# Patient Record
Sex: MALE | Race: WHITE | NOT HISPANIC OR LATINO | Employment: OTHER | URBAN - METROPOLITAN AREA
[De-identification: names, ages, dates, MRNs, and addresses within clinical notes are randomized per-mention and may not be internally consistent; named-entity substitution may affect disease eponyms.]

---

## 2024-07-06 NOTE — PATIENT INSTRUCTIONS
It was great meeting you today Pancho!     As we discussed, I have ordered labs for you.   Please have your blood drawn in the next 1-2 weeks.   You need to be FASTING for 12 hours prior to blood draw.  Please contact your insurance company to ask about the best location to get blood drawn.  We will contact you with the results of your blood work and any necessary adjustments to your plan of care.  Iif you do not hear from us within 3-5 days of having your blood drawn, please call the [place] office at [phone]     I have ordered Cologuard test for screening purposes.     I have also ordered a Cardiac CT calcium scoring test as well a venous duplex for the bilateral lower extremity swelling and discoloration.     Follow up in 4 months.

## 2024-07-06 NOTE — PROGRESS NOTES
Patient ID:   Pancho Brito is a 64 y.o. male with PMH remarkable for arteriosclerotic cardiovascular disease, JULIO C, vitamin D deficiency, BPH, and depression who presents to the office today to establish care with me.  No chief complaint on file..    HEALTH MAINTENANCE: Establish Care  Previous PCP:   Last Office Visit:  Last Labs: 2022  PSA Screening (starting at age 55 till 69): 2/1/2024 -> 13.80 on Flomax  Colonoscopy (45-75 or age 40 with 1st degree relative dx colon ca): Had a Cologuard 6/17/2021. Normal result Never had abnormal results. Will repeat COloguard  Lung cancer screening (50-78 y/o + 20 pack year + smoking/quit in last 15 years):  Never a smoker  Cardiac Calcium Scoring (55+, q 10 years): Ordered   DEXA (65+, q 2 years): NA    HPI  BPH with LUTs  hx of elevated PSA, and BPH w/ LUTs. Dr Rangel recommended a prostate biopsy. Patient refused biopsy without an MRI. MRI was ordered, but patient's insurance would not pay for it.   Currently only taking  0.4 Flomax instead of 0.8 per Dr. Hughes   Patient wishes for conservative treatment.    Patient's father had prostate cancer   No previous prostate biopsies     Vitamin D deficiency  Currently takes vitamin d -> 50,000 units  Will check labs    JULIO C  Currently using CPAP  Scheduled for a sleep study next week.     Arteriosclerotic Cardiovascular Disease  Will check for CT Cardiac calcium scoring  Will check labs     Depression  Currently feeling well.     BLE Edema and skin discoloration  Hemosideran deposits started about a year ago. Ankle and Lower leg swelling. Denies pain. Varicose veins present.          There is no immunization history on file for this patient.    Review of Systems   Constitutional: Negative.    Respiratory:  Positive for cough. Negative for shortness of breath and wheezing.    Cardiovascular:  Positive for leg swelling. Negative for chest pain and palpitations.   Gastrointestinal: Negative.    Genitourinary:  Positive for  "frequency. Negative for difficulty urinating.   Musculoskeletal: Negative.    Skin:  Positive for color change.        Bilaterally on inner ankles    Neurological: Negative.  Negative for dizziness, light-headedness and headaches.   Psychiatric/Behavioral: Negative.  Negative for sleep disturbance. The patient is not nervous/anxious.        Not on File     There were no vitals taken for this visit.    Physical Exam  Constitutional:       General: He is not in acute distress.     Appearance: Normal appearance.   HENT:      Head: Normocephalic.   Cardiovascular:      Rate and Rhythm: Normal rate and regular rhythm.      Pulses: Normal pulses.      Heart sounds: Normal heart sounds. No murmur heard.     No friction rub. No gallop.   Pulmonary:      Effort: Pulmonary effort is normal.      Breath sounds: Normal breath sounds. No wheezing, rhonchi or rales.   Abdominal:      General: Bowel sounds are normal. There is no distension.      Palpations: Abdomen is soft.      Tenderness: There is no abdominal tenderness. There is no guarding.   Musculoskeletal:         General: Normal range of motion.      Cervical back: Normal range of motion.   Skin:     General: Skin is warm.   Neurological:      Mental Status: He is alert and oriented to person, place, and time.   Psychiatric:         Mood and Affect: Mood normal.         No current outpatient medications    No results found for: \"WBC\", \"HGB\", \"HCT\", \"PLT\", \"CHOL\", \"TRIG\", \"HDL\", \"LDLDIRECT\", \"ALT\", \"AST\", \"NA\", \"K\", \"CL\", \"CREATININE\", \"BUN\", \"CO2\", \"TSH\", \"PSA\", \"INR\", \"GLUF\", \"HGBA1C\", \"ALBUR\"    ASSESSMENT AND PLAN:  Assessment/Plan   Problem List Items Addressed This Visit             ICD-10-CM    Benign prostatic hyperplasia with lower urinary tract symptoms N40.1     Dr Rangel recommended a prostate biopsy. Patient refused biopsy without an MRI.   -MRI was ordered, but patient's insurance would not pay for it.   -Currently only taking  0.4 Flomax instead of 0.8 as " ordered by Dr. Hughes   -Reports that 0.8 mg lowers his BP too much.  -Patient wishes for conservative treatment.    -Patient's father had prostate cancer   -No previous prostate biopsies          Vitamin D deficiency E55.9     Currently taking 50,000 units when he remembers  -Refills sent  -Will recheck labs         JULIO C (obstructive sleep apnea) G47.33     Currently using CPAP  -Scheduled for sleep study next week.         Depression F32.A     Currently feels well         Peripheral venous insufficiency I87.2     Hemosideran deposits started about a year ago.   -Ankle and Lower leg swelling.   -Denies pain.   -Varicose veins present.   -Venous Duplex ordered  -Compression stockings encouraged   -Weight management encouraged   -Elevate legs periodically  -Regular exercises encouraged to increase circulation           Other Visit Diagnoses         Codes    Screening PSA (prostate specific antigen)    -  Primary Z12.5    Relevant Orders    Prostate Specific Antigen, Screen    Screening for cardiovascular condition     Z13.6    Relevant Orders    CT cardiac scoring wo IV contrast    Healthcare maintenance     Z00.00    Relevant Medications    ergocalciferol (Vitamin D-2) 1.25 MG (47134 UT) capsule (Start on 7/14/2024)    Other Relevant Orders    CBC and Auto Differential    Comprehensive Metabolic Panel    Lipid Panel    Hemoglobin A1C    Vitamin D 25-Hydroxy,Total (for eval of Vitamin D levels)    Vitamin B12    Prostate Specific Antigen, Screen    Bilateral lower extremity edema     R60.0    Relevant Orders    Vascular US lower extremity venous duplex bilateral    Encounter for colorectal cancer screening using Cologuard test     Z12.11, Z12.12    Relevant Orders    Cologuard® colon cancer screening          Assessment/Plan   --------------------  Yearly labs  Cologuard  Venous Duplex   Cardiac scoring   Follow up in 4 months

## 2024-07-08 ENCOUNTER — LAB (OUTPATIENT)
Dept: LAB | Facility: LAB | Age: 64
End: 2024-07-08
Payer: COMMERCIAL

## 2024-07-08 ENCOUNTER — APPOINTMENT (OUTPATIENT)
Dept: PRIMARY CARE | Facility: CLINIC | Age: 64
End: 2024-07-08
Payer: COMMERCIAL

## 2024-07-08 VITALS
OXYGEN SATURATION: 96 % | HEART RATE: 63 BPM | HEIGHT: 70 IN | SYSTOLIC BLOOD PRESSURE: 130 MMHG | RESPIRATION RATE: 16 BRPM | DIASTOLIC BLOOD PRESSURE: 82 MMHG | WEIGHT: 202.6 LBS | BODY MASS INDEX: 29.01 KG/M2 | TEMPERATURE: 97 F

## 2024-07-08 DIAGNOSIS — Z13.6 SCREENING FOR CARDIOVASCULAR CONDITION: ICD-10-CM

## 2024-07-08 DIAGNOSIS — G47.33 OSA (OBSTRUCTIVE SLEEP APNEA): ICD-10-CM

## 2024-07-08 DIAGNOSIS — Z12.5 SCREENING PSA (PROSTATE SPECIFIC ANTIGEN): Primary | ICD-10-CM

## 2024-07-08 DIAGNOSIS — E55.9 VITAMIN D DEFICIENCY: ICD-10-CM

## 2024-07-08 DIAGNOSIS — Z12.11 ENCOUNTER FOR COLORECTAL CANCER SCREENING USING COLOGUARD TEST: ICD-10-CM

## 2024-07-08 DIAGNOSIS — R60.0 BILATERAL LOWER EXTREMITY EDEMA: ICD-10-CM

## 2024-07-08 DIAGNOSIS — Z00.00 HEALTHCARE MAINTENANCE: ICD-10-CM

## 2024-07-08 DIAGNOSIS — F32.A DEPRESSION, UNSPECIFIED DEPRESSION TYPE: ICD-10-CM

## 2024-07-08 DIAGNOSIS — I87.2 PERIPHERAL VENOUS INSUFFICIENCY: ICD-10-CM

## 2024-07-08 DIAGNOSIS — Z12.5 SCREENING PSA (PROSTATE SPECIFIC ANTIGEN): ICD-10-CM

## 2024-07-08 DIAGNOSIS — N40.1 BENIGN PROSTATIC HYPERPLASIA WITH LOWER URINARY TRACT SYMPTOMS, SYMPTOM DETAILS UNSPECIFIED: ICD-10-CM

## 2024-07-08 DIAGNOSIS — Z12.12 ENCOUNTER FOR COLORECTAL CANCER SCREENING USING COLOGUARD TEST: ICD-10-CM

## 2024-07-08 PROBLEM — J45.909 REACTIVE AIRWAY DISEASE (HHS-HCC): Status: ACTIVE | Noted: 2024-07-08

## 2024-07-08 PROBLEM — M66.321 NONTRAUMATIC RUPTURE OF RIGHT PROXIMAL BICEPS TENDON: Status: ACTIVE | Noted: 2021-11-17

## 2024-07-08 PROBLEM — H66.009 ACUTE SUPPURATIVE OTITIS MEDIA: Status: ACTIVE | Noted: 2024-07-08

## 2024-07-08 PROBLEM — J30.9 ALLERGIC RHINITIS: Status: ACTIVE | Noted: 2024-07-08

## 2024-07-08 PROBLEM — M25.511 ACUTE PAIN OF RIGHT SHOULDER: Status: ACTIVE | Noted: 2021-11-17

## 2024-07-08 PROBLEM — I25.10 ASCVD (ARTERIOSCLEROTIC CARDIOVASCULAR DISEASE): Status: ACTIVE | Noted: 2021-06-07

## 2024-07-08 PROBLEM — M19.011 LOCALIZED OSTEOARTHRITIS OF RIGHT SHOULDER: Status: ACTIVE | Noted: 2021-11-17

## 2024-07-08 PROBLEM — H16.041 MARGINAL CORNEAL ULCER OF RIGHT EYE: Status: ACTIVE | Noted: 2017-09-22

## 2024-07-08 LAB
ALBUMIN SERPL BCP-MCNC: 4.4 G/DL (ref 3.4–5)
ALP SERPL-CCNC: 54 U/L (ref 33–136)
ALT SERPL W P-5'-P-CCNC: 7 U/L (ref 10–52)
ANION GAP SERPL CALC-SCNC: 10 MMOL/L (ref 10–20)
AST SERPL W P-5'-P-CCNC: 15 U/L (ref 9–39)
BASOPHILS # BLD AUTO: 0.01 X10*3/UL (ref 0–0.1)
BASOPHILS NFR BLD AUTO: 0.2 %
BILIRUB SERPL-MCNC: 0.7 MG/DL (ref 0–1.2)
BUN SERPL-MCNC: 20 MG/DL (ref 6–23)
CALCIUM SERPL-MCNC: 9.2 MG/DL (ref 8.6–10.3)
CHLORIDE SERPL-SCNC: 103 MMOL/L (ref 98–107)
CHOLEST SERPL-MCNC: 156 MG/DL (ref 0–199)
CHOLESTEROL/HDL RATIO: 4.9
CO2 SERPL-SCNC: 29 MMOL/L (ref 21–32)
CREAT SERPL-MCNC: 0.9 MG/DL (ref 0.5–1.3)
EGFRCR SERPLBLD CKD-EPI 2021: >90 ML/MIN/1.73M*2
EOSINOPHIL # BLD AUTO: 0.04 X10*3/UL (ref 0–0.7)
EOSINOPHIL NFR BLD AUTO: 0.8 %
ERYTHROCYTE [DISTWIDTH] IN BLOOD BY AUTOMATED COUNT: 12.6 % (ref 11.5–14.5)
GLUCOSE SERPL-MCNC: 89 MG/DL (ref 74–99)
HCT VFR BLD AUTO: 45.9 % (ref 41–52)
HDLC SERPL-MCNC: 32 MG/DL
HGB BLD-MCNC: 15.3 G/DL (ref 13.5–17.5)
IMM GRANULOCYTES # BLD AUTO: 0.02 X10*3/UL (ref 0–0.7)
IMM GRANULOCYTES NFR BLD AUTO: 0.4 % (ref 0–0.9)
LDLC SERPL CALC-MCNC: 72 MG/DL
LYMPHOCYTES # BLD AUTO: 1.28 X10*3/UL (ref 1.2–4.8)
LYMPHOCYTES NFR BLD AUTO: 25 %
MCH RBC QN AUTO: 28.8 PG (ref 26–34)
MCHC RBC AUTO-ENTMCNC: 33.3 G/DL (ref 32–36)
MCV RBC AUTO: 86 FL (ref 80–100)
MONOCYTES # BLD AUTO: 0.37 X10*3/UL (ref 0.1–1)
MONOCYTES NFR BLD AUTO: 7.2 %
NEUTROPHILS # BLD AUTO: 3.39 X10*3/UL (ref 1.2–7.7)
NEUTROPHILS NFR BLD AUTO: 66.4 %
NON HDL CHOLESTEROL: 124 MG/DL (ref 0–149)
NRBC BLD-RTO: 0 /100 WBCS (ref 0–0)
PLATELET # BLD AUTO: 182 X10*3/UL (ref 150–450)
POTASSIUM SERPL-SCNC: 4 MMOL/L (ref 3.5–5.3)
PROT SERPL-MCNC: 6.8 G/DL (ref 6.4–8.2)
RBC # BLD AUTO: 5.32 X10*6/UL (ref 4.5–5.9)
SODIUM SERPL-SCNC: 138 MMOL/L (ref 136–145)
TRIGL SERPL-MCNC: 259 MG/DL (ref 0–149)
VLDL: 52 MG/DL (ref 0–40)
WBC # BLD AUTO: 5.1 X10*3/UL (ref 4.4–11.3)

## 2024-07-08 PROCEDURE — 82607 VITAMIN B-12: CPT

## 2024-07-08 PROCEDURE — 99204 OFFICE O/P NEW MOD 45 MIN: CPT

## 2024-07-08 PROCEDURE — 82306 VITAMIN D 25 HYDROXY: CPT

## 2024-07-08 PROCEDURE — 83036 HEMOGLOBIN GLYCOSYLATED A1C: CPT

## 2024-07-08 PROCEDURE — 80061 LIPID PANEL: CPT

## 2024-07-08 PROCEDURE — 80053 COMPREHEN METABOLIC PANEL: CPT

## 2024-07-08 PROCEDURE — 36415 COLL VENOUS BLD VENIPUNCTURE: CPT

## 2024-07-08 PROCEDURE — 84153 ASSAY OF PSA TOTAL: CPT

## 2024-07-08 PROCEDURE — 85025 COMPLETE CBC W/AUTO DIFF WBC: CPT

## 2024-07-08 PROCEDURE — 1036F TOBACCO NON-USER: CPT

## 2024-07-08 RX ORDER — ERGOCALCIFEROL 1.25 MG/1
50000 CAPSULE ORAL
Qty: 4 CAPSULE | Refills: 1 | Status: SHIPPED | OUTPATIENT
Start: 2024-07-14 | End: 2024-09-08

## 2024-07-08 RX ORDER — TAMSULOSIN HYDROCHLORIDE 0.4 MG/1
0.4 CAPSULE ORAL NIGHTLY
COMMUNITY
Start: 2024-03-15

## 2024-07-08 ASSESSMENT — PATIENT HEALTH QUESTIONNAIRE - PHQ9
1. LITTLE INTEREST OR PLEASURE IN DOING THINGS: NOT AT ALL
SUM OF ALL RESPONSES TO PHQ9 QUESTIONS 1 AND 2: 0
2. FEELING DOWN, DEPRESSED OR HOPELESS: NOT AT ALL

## 2024-07-08 ASSESSMENT — COLUMBIA-SUICIDE SEVERITY RATING SCALE - C-SSRS
2. HAVE YOU ACTUALLY HAD ANY THOUGHTS OF KILLING YOURSELF?: NO
6. HAVE YOU EVER DONE ANYTHING, STARTED TO DO ANYTHING, OR PREPARED TO DO ANYTHING TO END YOUR LIFE?: NO
1. IN THE PAST MONTH, HAVE YOU WISHED YOU WERE DEAD OR WISHED YOU COULD GO TO SLEEP AND NOT WAKE UP?: NO

## 2024-07-08 ASSESSMENT — PAIN SCALES - GENERAL: PAINLEVEL: 0-NO PAIN

## 2024-07-08 ASSESSMENT — ENCOUNTER SYMPTOMS
NEUROLOGICAL NEGATIVE: 1
COUGH: 1
PALPITATIONS: 0
NERVOUS/ANXIOUS: 0
COLOR CHANGE: 1
WHEEZING: 0
GASTROINTESTINAL NEGATIVE: 1
HEADACHES: 0
DIFFICULTY URINATING: 0
DIZZINESS: 0
PSYCHIATRIC NEGATIVE: 1
FREQUENCY: 1
SLEEP DISTURBANCE: 0
LIGHT-HEADEDNESS: 0
MUSCULOSKELETAL NEGATIVE: 1
CONSTITUTIONAL NEGATIVE: 1
SHORTNESS OF BREATH: 0

## 2024-07-08 NOTE — ASSESSMENT & PLAN NOTE
Hemosideran deposits started about a year ago.   -Ankle and Lower leg swelling.   -Denies pain.   -Varicose veins present.   -Venous Duplex ordered  -Compression stockings encouraged   -Weight management encouraged   -Elevate legs periodically  -Regular exercises encouraged to increase circulation

## 2024-07-08 NOTE — ASSESSMENT & PLAN NOTE
Dr Rangel recommended a prostate biopsy. Patient refused biopsy without an MRI.   -MRI was ordered, but patient's insurance would not pay for it.   -Currently only taking  0.4 Flomax instead of 0.8 as ordered by Dr. Hughes   -Reports that 0.8 mg lowers his BP too much.  -Patient wishes for conservative treatment.    -Patient's father had prostate cancer   -No previous prostate biopsies

## 2024-07-09 LAB
25(OH)D3 SERPL-MCNC: 22 NG/ML (ref 30–100)
EST. AVERAGE GLUCOSE BLD GHB EST-MCNC: 111 MG/DL
HBA1C MFR BLD: 5.5 %
PSA SERPL-MCNC: 8.13 NG/ML
VIT B12 SERPL-MCNC: 267 PG/ML (ref 211–911)

## 2024-07-10 DIAGNOSIS — E78.1 HIGH BLOOD TRIGLYCERIDES: Primary | ICD-10-CM

## 2024-07-10 RX ORDER — FENOFIBRATE 48 MG/1
48 TABLET, FILM COATED ORAL DAILY
Qty: 30 TABLET | Refills: 11 | Status: SHIPPED | OUTPATIENT
Start: 2024-07-10 | End: 2025-07-10

## 2024-08-02 LAB — NONINV COLON CA DNA+OCC BLD SCRN STL QL: NEGATIVE

## 2024-08-16 ENCOUNTER — HOSPITAL ENCOUNTER (OUTPATIENT)
Dept: RADIOLOGY | Facility: HOSPITAL | Age: 64
Discharge: HOME | End: 2024-08-16
Payer: COMMERCIAL

## 2024-08-16 DIAGNOSIS — Z13.6 SCREENING FOR CARDIOVASCULAR CONDITION: ICD-10-CM

## 2024-08-16 DIAGNOSIS — R60.0 BILATERAL LOWER EXTREMITY EDEMA: ICD-10-CM

## 2024-08-16 PROCEDURE — 75571 CT HRT W/O DYE W/CA TEST: CPT

## 2024-08-16 PROCEDURE — 93970 EXTREMITY STUDY: CPT

## 2024-08-21 ASSESSMENT — ENCOUNTER SYMPTOMS
GASTROINTESTINAL NEGATIVE: 1
HEADACHES: 0
PALPITATIONS: 0
DIZZINESS: 0
MUSCULOSKELETAL NEGATIVE: 1
PSYCHIATRIC NEGATIVE: 1
LIGHT-HEADEDNESS: 0
NERVOUS/ANXIOUS: 0
NEUROLOGICAL NEGATIVE: 1
SHORTNESS OF BREATH: 0
CONSTITUTIONAL NEGATIVE: 1
DIFFICULTY URINATING: 0
FREQUENCY: 1
WHEEZING: 0
COLOR CHANGE: 1
SLEEP DISTURBANCE: 0

## 2024-08-21 NOTE — PROGRESS NOTES
Patient ID:   Pancho Brito is a 64 y.o. male with PMH remarkable for arteriosclerotic cardiovascular disease, JULIO C, vitamin D deficiency, BPH, and depression who presents to the office today to discuss her CT cardiac calcium score.   Results (Ct cardiac score).    HEALTH MAINTENANCE: \[facility] [place] [facility]\"" Care  Last Office Visit: 7/8/2024  Last Labs: 7/2024  PSA Screening (starting at age 55 till 69): 7/8/2024 -> 8.13 on Flomax  Colonoscopy (45-75 or age 40 with 1st degree relative dx colon ca): Cologuard 7/30/2024 WNL; repeat in 7/2027  Lung cancer screening (50-78 y/o + 20 pack year + smoking/quit in last 15 years):  Never a smoker  Cardiac Calcium Scoring (55+, q 10 years): 8/16/2024 score of 1744 with highest score in the RCA  DEXA (65+, q 2 years): NA    HPI  BPH with LUTs  hx of elevated PSA, and BPH w/ LUTs. Dr Rangel recommended a prostate biopsy. Patient refused biopsy without an MRI. MRI was ordered, but patient's insurance would not pay for it.   Currently only taking  0.4 Flomax instead of 0.8 per Dr. Hughes   Patient wishes for conservative treatment.    Patient's father had prostate cancer   No previous prostate biopsies     Vitamin D deficiency  Last Vitamin D level from 7/2024 at 22  Currently takes vitamin d 50,000 units weekly      JULIO C  Currently using CPAP  Scheduled for a sleep study next week.     Arteriosclerotic Cardiovascular Disease  CT Cardiac calcium scoring done in July 2024 which showed elevated calcium. Score distribution breakdown is as follows:  LM 2.56,  .1,  LCx 329.96,  .27,  -> Total 1744    Abnormal lipid panel from 7/2024; repeat in January  -VLDL elevated at 52  -Triglycerides elevated at 259  -Fenofibrate 48 mg every day-however, he has not started it because he is concerned with side effects.    -Could try Lovaza instead or fish oil 1-4 Grams daily   -Recommend starting Aspirin 81 mg every day        Depression  Currently feeling well.     BLE Edema and skin  "discoloration  Hemosideran deposits started about a year ago. Ankle and Lower leg swelling. Denies pain. Varicose veins present.   -> bilateral venous duplex completed 8/16/2024 WNL    Social History     Tobacco Use    Smoking status: Never    Smokeless tobacco: Never   Vaping Use    Vaping status: Never Used   Substance Use Topics    Alcohol use: Never    Drug use: Never       Immunization History   Administered Date(s) Administered    Tdap vaccine, age 7 year and older (BOOSTRIX, ADACEL) 11/01/2018       Review of Systems   Constitutional: Negative.    Respiratory: Negative.  Negative for cough, shortness of breath and wheezing.    Cardiovascular:  Positive for leg swelling. Negative for chest pain and palpitations.   Gastrointestinal: Negative.    Endocrine: Negative.    Genitourinary:  Positive for frequency. Negative for difficulty urinating.   Musculoskeletal: Negative.    Skin:  Positive for color change.        Bilaterally on inner ankles    Neurological: Negative.  Negative for dizziness, light-headedness and headaches.   Psychiatric/Behavioral: Negative.  Negative for sleep disturbance. The patient is not nervous/anxious.        Allergies   Allergen Reactions    Bee Venom Protein (Honey Bee) Swelling    Pollen Extracts Other     Eye irritation and sinus issues        Visit Vitals  /78 (BP Location: Right arm)   Pulse 71   Ht 1.778 m (5' 10\")   Wt 92.1 kg (203 lb)   SpO2 98%   BMI 29.13 kg/m²   Smoking Status Never   BSA 2.13 m²       Physical Exam  Constitutional:       General: He is not in acute distress.     Appearance: Normal appearance.   HENT:      Head: Normocephalic.   Cardiovascular:      Rate and Rhythm: Normal rate and regular rhythm.      Pulses: Normal pulses.      Heart sounds: Normal heart sounds. No murmur heard.     No friction rub. No gallop.   Pulmonary:      Effort: Pulmonary effort is normal.      Breath sounds: Normal breath sounds. No wheezing, rhonchi or rales.   Abdominal:    "   General: Bowel sounds are normal. There is no distension.      Palpations: Abdomen is soft.      Tenderness: There is no abdominal tenderness. There is no guarding.   Musculoskeletal:         General: Normal range of motion.      Cervical back: Normal range of motion.   Skin:     General: Skin is warm.   Neurological:      Mental Status: He is alert and oriented to person, place, and time.      Motor: No weakness.   Psychiatric:         Mood and Affect: Mood normal.         Behavior: Behavior normal.         Current Outpatient Medications   Medication Instructions    aspirin 81 mg, oral, Daily    ergocalciferol (VITAMIN D-2) 50,000 Units, oral, Once Weekly    fenofibrate (TRICOR) 48 mg, oral, Daily    tamsulosin (FLOMAX) 0.4 mg, oral, Nightly       Lab Results   Component Value Date    WBC 5.1 07/08/2024    HGB 15.3 07/08/2024    HCT 45.9 07/08/2024     07/08/2024    CHOL 156 07/08/2024    TRIG 259 (H) 07/08/2024    HDL 32.0 07/08/2024    ALT 7 (L) 07/08/2024    AST 15 07/08/2024     07/08/2024    K 4.0 07/08/2024     07/08/2024    CREATININE 0.90 07/08/2024    BUN 20 07/08/2024    CO2 29 07/08/2024    HGBA1C 5.5 07/08/2024       ASSESSMENT AND PLAN:  Assessment/Plan   Problem List Items Addressed This Visit             ICD-10-CM    ASCVD (arteriosclerotic cardiovascular disease) I25.10     CT Cardiac calcium scoring done in July 2024 which showed elevated calcium. Score distribution breakdown is as follows:  LM 2.56,  .1,  LCx 329.96,  .27,  -> Total 1744    Abnormal lipid panel from 7/2024; repeat in January prior to his 6 month follow up  -VLDL elevated at 52  -Triglycerides elevated at 259  -Fenofibrate 48 mg every day-however, he has not started it because he is concerned with side effects.    -Could try Lovaza instead or fish oil 1-4 Grams daily   -Recommend starting Aspirin 81 mg every day    -He states he will start the Aspirin and look into both the Fenofibrate and Fish  Oil options and will follow up in 6 months for repeat labs.   -We also discussed meal prepping to try to follow a more low-fat, low-cholesterol diet.          Benign prostatic hyperplasia with lower urinary tract symptoms N40.1    Relevant Medications    tamsulosin (Flomax) 0.4 mg 24 hr capsule    Peripheral venous insufficiency I87.2     Hemosideran deposits started about a year ago. Ankle and Lower leg swelling. Denies pain. Varicose veins present.   -> bilateral venous duplex completed 8/16/2024 WNL  -recommend compression stockings. Requisition printed           Other Visit Diagnoses         Codes    Localized edema    -  Primary R60.0    Relevant Orders    Miscellaneous [facility]    Healthcare maintenance     Z00.00    Relevant Medications    aspirin 81 mg EC tablet    Other Relevant Orders    Comprehensive Metabolic Panel    Lipid Panel    TSH with reflex to Free T4 if abnormal    CBC and Auto Differential    Vitamin D 25-Hydroxy,Total (for eval of Vitamin D levels)    Hemoglobin A1C    Prostate Specific Antigen, Screen    Screening PSA (prostate specific antigen)     Z12.5    Relevant Orders    Prostate Specific Antigen, Screen            Assessment/Plan   --------------------  Start aspirin 81 mg every day   Follow up in 6 months with blood work   Start either Fenofibrate or fish oil to lower triglycerides

## 2024-08-23 ENCOUNTER — OFFICE VISIT (OUTPATIENT)
Dept: PRIMARY CARE | Facility: CLINIC | Age: 64
End: 2024-08-23
Payer: COMMERCIAL

## 2024-08-23 VITALS
DIASTOLIC BLOOD PRESSURE: 78 MMHG | HEART RATE: 71 BPM | HEIGHT: 70 IN | BODY MASS INDEX: 29.06 KG/M2 | OXYGEN SATURATION: 98 % | SYSTOLIC BLOOD PRESSURE: 114 MMHG | WEIGHT: 203 LBS

## 2024-08-23 DIAGNOSIS — R60.0 LOCALIZED EDEMA: Primary | ICD-10-CM

## 2024-08-23 DIAGNOSIS — N40.1 BENIGN PROSTATIC HYPERPLASIA WITH LOWER URINARY TRACT SYMPTOMS, SYMPTOM DETAILS UNSPECIFIED: ICD-10-CM

## 2024-08-23 DIAGNOSIS — I87.2 PERIPHERAL VENOUS INSUFFICIENCY: ICD-10-CM

## 2024-08-23 DIAGNOSIS — Z00.00 HEALTHCARE MAINTENANCE: ICD-10-CM

## 2024-08-23 DIAGNOSIS — I25.10 ASCVD (ARTERIOSCLEROTIC CARDIOVASCULAR DISEASE): ICD-10-CM

## 2024-08-23 DIAGNOSIS — Z12.5 SCREENING PSA (PROSTATE SPECIFIC ANTIGEN): ICD-10-CM

## 2024-08-23 PROCEDURE — 1036F TOBACCO NON-USER: CPT

## 2024-08-23 PROCEDURE — 3008F BODY MASS INDEX DOCD: CPT

## 2024-08-23 PROCEDURE — 99214 OFFICE O/P EST MOD 30 MIN: CPT

## 2024-08-23 RX ORDER — ASPIRIN 81 MG/1
81 TABLET ORAL DAILY
Qty: 90 TABLET | Refills: 1 | Status: SHIPPED | OUTPATIENT
Start: 2024-08-23

## 2024-08-23 RX ORDER — ASPIRIN 81 MG/1
81 TABLET ORAL DAILY
COMMUNITY
End: 2024-08-23 | Stop reason: SDUPTHER

## 2024-08-23 RX ORDER — TAMSULOSIN HYDROCHLORIDE 0.4 MG/1
0.4 CAPSULE ORAL NIGHTLY
Qty: 90 CAPSULE | Refills: 1 | Status: SHIPPED | OUTPATIENT
Start: 2024-08-23

## 2024-08-23 ASSESSMENT — PATIENT HEALTH QUESTIONNAIRE - PHQ9
SUM OF ALL RESPONSES TO PHQ9 QUESTIONS 1 AND 2: 0
1. LITTLE INTEREST OR PLEASURE IN DOING THINGS: NOT AT ALL
2. FEELING DOWN, DEPRESSED OR HOPELESS: NOT AT ALL

## 2024-08-23 ASSESSMENT — PAIN SCALES - GENERAL: PAINLEVEL: 0-NO PAIN

## 2024-08-23 ASSESSMENT — ENCOUNTER SYMPTOMS
RESPIRATORY NEGATIVE: 1
COUGH: 0
ENDOCRINE NEGATIVE: 1

## 2024-08-23 NOTE — PATIENT INSTRUCTIONS
Follow up in 6 months and repeat labs prior to coming in for appointment.     Continue with aspirin if anything   If hesitant about the fenofibrate, consider fish oil 1-4 g daily

## 2024-08-23 NOTE — ASSESSMENT & PLAN NOTE
CT Cardiac calcium scoring done in July 2024 which showed elevated calcium. Score distribution breakdown is as follows:  LM 2.56,  .1,  LCx 329.96,  .27,  -> Total 1744    Abnormal lipid panel from 7/2024; repeat in January prior to his 6 month follow up  -VLDL elevated at 52  -Triglycerides elevated at 259  -Fenofibrate 48 mg every day-however, he has not started it because he is concerned with side effects.    -Could try Lovaza instead or fish oil 1-4 Grams daily   -Recommend starting Aspirin 81 mg every day    -He states he will start the Aspirin and look into both the Fenofibrate and Fish Oil options and will follow up in 6 months for repeat labs.   -We also discussed meal prepping to try to follow a more low-fat, low-cholesterol diet.

## 2024-08-23 NOTE — ASSESSMENT & PLAN NOTE
Hemosideran deposits started about a year ago. Ankle and Lower leg swelling. Denies pain. Varicose veins present.   -> bilateral venous duplex completed 8/16/2024 WNL  -recommend compression stockings. Requisition printed

## 2024-11-03 ASSESSMENT — ENCOUNTER SYMPTOMS
ENDOCRINE NEGATIVE: 1
NERVOUS/ANXIOUS: 0
SLEEP DISTURBANCE: 0
MUSCULOSKELETAL NEGATIVE: 1
COLOR CHANGE: 1
DIFFICULTY URINATING: 0
SHORTNESS OF BREATH: 0
NEUROLOGICAL NEGATIVE: 1
WHEEZING: 0
PSYCHIATRIC NEGATIVE: 1
PALPITATIONS: 0
FREQUENCY: 1
GASTROINTESTINAL NEGATIVE: 1
DIZZINESS: 0
COUGH: 0
RESPIRATORY NEGATIVE: 1
HEADACHES: 0
CONSTITUTIONAL NEGATIVE: 1
LIGHT-HEADEDNESS: 0

## 2024-11-07 ENCOUNTER — LAB (OUTPATIENT)
Dept: LAB | Facility: LAB | Age: 64
End: 2024-11-07
Payer: COMMERCIAL

## 2024-11-07 DIAGNOSIS — Z00.00 HEALTHCARE MAINTENANCE: ICD-10-CM

## 2024-11-07 DIAGNOSIS — Z12.5 SCREENING PSA (PROSTATE SPECIFIC ANTIGEN): ICD-10-CM

## 2024-11-07 LAB
ALBUMIN SERPL BCP-MCNC: 4.3 G/DL (ref 3.4–5)
ALP SERPL-CCNC: 59 U/L (ref 33–136)
ALT SERPL W P-5'-P-CCNC: 7 U/L (ref 10–52)
ANION GAP SERPL CALC-SCNC: 12 MMOL/L (ref 10–20)
AST SERPL W P-5'-P-CCNC: 17 U/L (ref 9–39)
BASOPHILS # BLD AUTO: 0.02 X10*3/UL (ref 0–0.1)
BASOPHILS NFR BLD AUTO: 0.5 %
BILIRUB SERPL-MCNC: 1.2 MG/DL (ref 0–1.2)
BUN SERPL-MCNC: 18 MG/DL (ref 6–23)
CALCIUM SERPL-MCNC: 9.3 MG/DL (ref 8.6–10.3)
CHLORIDE SERPL-SCNC: 103 MMOL/L (ref 98–107)
CHOLEST SERPL-MCNC: 155 MG/DL (ref 0–199)
CHOLESTEROL/HDL RATIO: 3.7
CO2 SERPL-SCNC: 29 MMOL/L (ref 21–32)
CREAT SERPL-MCNC: 0.91 MG/DL (ref 0.5–1.3)
EGFRCR SERPLBLD CKD-EPI 2021: >90 ML/MIN/1.73M*2
EOSINOPHIL # BLD AUTO: 0.05 X10*3/UL (ref 0–0.7)
EOSINOPHIL NFR BLD AUTO: 1.1 %
ERYTHROCYTE [DISTWIDTH] IN BLOOD BY AUTOMATED COUNT: 12.7 % (ref 11.5–14.5)
GLUCOSE SERPL-MCNC: 88 MG/DL (ref 74–99)
HCT VFR BLD AUTO: 46.9 % (ref 41–52)
HDLC SERPL-MCNC: 41.7 MG/DL
HGB BLD-MCNC: 15.6 G/DL (ref 13.5–17.5)
IMM GRANULOCYTES # BLD AUTO: 0.02 X10*3/UL (ref 0–0.7)
IMM GRANULOCYTES NFR BLD AUTO: 0.5 % (ref 0–0.9)
LDLC SERPL CALC-MCNC: 84 MG/DL
LYMPHOCYTES # BLD AUTO: 1.18 X10*3/UL (ref 1.2–4.8)
LYMPHOCYTES NFR BLD AUTO: 26.9 %
MCH RBC QN AUTO: 29.1 PG (ref 26–34)
MCHC RBC AUTO-ENTMCNC: 33.3 G/DL (ref 32–36)
MCV RBC AUTO: 88 FL (ref 80–100)
MONOCYTES # BLD AUTO: 0.35 X10*3/UL (ref 0.1–1)
MONOCYTES NFR BLD AUTO: 8 %
NEUTROPHILS # BLD AUTO: 2.77 X10*3/UL (ref 1.2–7.7)
NEUTROPHILS NFR BLD AUTO: 63 %
NON HDL CHOLESTEROL: 113 MG/DL (ref 0–149)
NRBC BLD-RTO: 0 /100 WBCS (ref 0–0)
PLATELET # BLD AUTO: 178 X10*3/UL (ref 150–450)
POTASSIUM SERPL-SCNC: 4.2 MMOL/L (ref 3.5–5.3)
PROT SERPL-MCNC: 6.8 G/DL (ref 6.4–8.2)
RBC # BLD AUTO: 5.36 X10*6/UL (ref 4.5–5.9)
SODIUM SERPL-SCNC: 140 MMOL/L (ref 136–145)
TRIGL SERPL-MCNC: 146 MG/DL (ref 0–149)
TSH SERPL-ACNC: 4.08 MIU/L (ref 0.44–3.98)
VLDL: 29 MG/DL (ref 0–40)
WBC # BLD AUTO: 4.4 X10*3/UL (ref 4.4–11.3)

## 2024-11-07 PROCEDURE — 84443 ASSAY THYROID STIM HORMONE: CPT

## 2024-11-07 PROCEDURE — 36415 COLL VENOUS BLD VENIPUNCTURE: CPT

## 2024-11-07 PROCEDURE — 85025 COMPLETE CBC W/AUTO DIFF WBC: CPT

## 2024-11-07 PROCEDURE — 84153 ASSAY OF PSA TOTAL: CPT

## 2024-11-07 PROCEDURE — 80061 LIPID PANEL: CPT

## 2024-11-07 PROCEDURE — 84439 ASSAY OF FREE THYROXINE: CPT

## 2024-11-07 PROCEDURE — 82306 VITAMIN D 25 HYDROXY: CPT

## 2024-11-07 PROCEDURE — 83036 HEMOGLOBIN GLYCOSYLATED A1C: CPT

## 2024-11-07 PROCEDURE — 80053 COMPREHEN METABOLIC PANEL: CPT

## 2024-11-08 ENCOUNTER — APPOINTMENT (OUTPATIENT)
Dept: PRIMARY CARE | Facility: CLINIC | Age: 64
End: 2024-11-08
Payer: COMMERCIAL

## 2024-11-08 VITALS
HEIGHT: 70 IN | OXYGEN SATURATION: 96 % | BODY MASS INDEX: 28.06 KG/M2 | WEIGHT: 196 LBS | SYSTOLIC BLOOD PRESSURE: 120 MMHG | HEART RATE: 59 BPM | DIASTOLIC BLOOD PRESSURE: 77 MMHG

## 2024-11-08 DIAGNOSIS — E78.2 MIXED HYPERLIPIDEMIA: ICD-10-CM

## 2024-11-08 DIAGNOSIS — E03.8 SUBCLINICAL HYPOTHYROIDISM: ICD-10-CM

## 2024-11-08 DIAGNOSIS — G47.33 OSA (OBSTRUCTIVE SLEEP APNEA): ICD-10-CM

## 2024-11-08 DIAGNOSIS — Z00.00 HEALTHCARE MAINTENANCE: ICD-10-CM

## 2024-11-08 DIAGNOSIS — E55.9 VITAMIN D DEFICIENCY: ICD-10-CM

## 2024-11-08 DIAGNOSIS — Z12.5 SCREENING PSA (PROSTATE SPECIFIC ANTIGEN): Primary | ICD-10-CM

## 2024-11-08 DIAGNOSIS — N40.1 BENIGN PROSTATIC HYPERPLASIA WITH LOWER URINARY TRACT SYMPTOMS, SYMPTOM DETAILS UNSPECIFIED: ICD-10-CM

## 2024-11-08 LAB
25(OH)D3 SERPL-MCNC: 26 NG/ML (ref 30–100)
EST. AVERAGE GLUCOSE BLD GHB EST-MCNC: 108 MG/DL
HBA1C MFR BLD: 5.4 %
PSA SERPL-MCNC: 9.96 NG/ML
T4 FREE SERPL-MCNC: 0.86 NG/DL (ref 0.61–1.12)

## 2024-11-08 PROCEDURE — 3008F BODY MASS INDEX DOCD: CPT

## 2024-11-08 PROCEDURE — 1036F TOBACCO NON-USER: CPT

## 2024-11-08 PROCEDURE — 99214 OFFICE O/P EST MOD 30 MIN: CPT

## 2024-11-08 RX ORDER — GLUCOSAM/CHONDRO/HERB 149/HYAL 750-100 MG
TABLET ORAL
COMMUNITY

## 2024-11-08 RX ORDER — TAMSULOSIN HYDROCHLORIDE 0.4 MG/1
0.4 CAPSULE ORAL NIGHTLY
Qty: 90 CAPSULE | Refills: 1 | Status: SHIPPED | OUTPATIENT
Start: 2024-11-08

## 2024-11-08 RX ORDER — ASPIRIN 81 MG/1
81 TABLET ORAL DAILY
Qty: 90 TABLET | Refills: 1 | Status: SHIPPED | OUTPATIENT
Start: 2024-11-08

## 2024-11-08 ASSESSMENT — PAIN SCALES - GENERAL: PAINLEVEL_OUTOF10: 4

## 2024-11-08 ASSESSMENT — ENCOUNTER SYMPTOMS: FATIGUE: 0

## 2024-11-08 NOTE — ASSESSMENT & PLAN NOTE
Last vitamin D from 11/2024 was still low at 26.   -He is taking the supplement weekly  -Will continue to monitor

## 2024-11-08 NOTE — ASSESSMENT & PLAN NOTE
Last TSH 11/7/2024 elevated at 4.08  T4 normal at 0.86  -Will continue to monitor  -Will repeat labs prior to next visit

## 2024-11-08 NOTE — ASSESSMENT & PLAN NOTE
-He did have his setting adjusted a few months ago,   -He reports he has not been using it like he should because he moved and is remodeling his house.   -I encouraged him to restart the CPAP due to risks associated with JULIO C,

## 2024-11-08 NOTE — ASSESSMENT & PLAN NOTE
PSA came back higher than before. He is at 9.96 and still only taking one Flomax instead of two because it makes him dizzy and lowers his BP too much.   -He will follow up on the MRI that was previously denies and with his Urologist, Dr. Hughes.

## 2024-11-08 NOTE — PATIENT INSTRUCTIONS
Follow up on the MRI with [facility]. If you need a new order, let me know. I will place the order in for you.     Follow up in 6 months. I will place lab orders in for you to complete prior to your next visit.

## 2024-11-08 NOTE — ASSESSMENT & PLAN NOTE
As noted in HPI  -Lipid panel has improved and is WNL now  -He is currently taking one Fish oil a day  -He has made dietary modifications and also lost weight. He is down 7#.   -Will continue with current therapy.   -Will repeat labs prior to next visit

## 2025-02-03 ASSESSMENT — ENCOUNTER SYMPTOMS
CONSTITUTIONAL NEGATIVE: 1
COUGH: 0
ENDOCRINE NEGATIVE: 1
RESPIRATORY NEGATIVE: 1
MUSCULOSKELETAL NEGATIVE: 1
DIFFICULTY URINATING: 0
NEUROLOGICAL NEGATIVE: 1
LIGHT-HEADEDNESS: 0
SHORTNESS OF BREATH: 0
NERVOUS/ANXIOUS: 0
GASTROINTESTINAL NEGATIVE: 1
PALPITATIONS: 0
HEADACHES: 0
FATIGUE: 0
WHEEZING: 0
PSYCHIATRIC NEGATIVE: 1
SLEEP DISTURBANCE: 0
DIZZINESS: 0

## 2025-02-03 NOTE — PROGRESS NOTES
Patient ID:   Pancho Brito is a 64 y.o. male with PMH remarkable for arteriosclerotic cardiovascular disease, JULIO C, vitamin D deficiency, BPH, Subclinical hypothyroidism and depression who presents to the office today for a follow up  Request For Order(s) (Testing for prostate, episwitch, has ppw to discuss./Also would like to be tested for genetic markers for autisms, also has ppw to discuss. /Requesting referrals. ).    HEALTH MAINTENANCE: Establish Care  Last Office Visit: 11/8/2024  Last Labs: 11/7/2024  PSA Screening (starting at age 55 till 69): 11/7/2024 -> 9.96 on Flomax  Colonoscopy (45-75 or age 40 with 1st degree relative dx colon ca): Cologuard 7/30/2024 WNL; repeat in 7/2027  Lung cancer screening (50-76 y/o + 20 pack year + smoking/quit in last 15 years):  Never a smoker  Cardiac Calcium Scoring (55+, q 10 years): 8/16/2024 score of 1744 with highest score in the RCA  DEXA (65+, q 2 years): NA    HPI  Pancho is here today, alone. He is asking to have genetic testing done with the [facility]. He explains that his daughter has Asperger's and he would like to know if he has this gene or if he is a carrier. He states he would want to know if he has this gene, or if he has Asperger's himself.     He is also here to discuss that he is scheduled to have his MRI scheduled on 2/14/25. He is also wondering how he can have the Episwitch prostate testing done.   He is now only following with Dr. Hughes with the [facility].   ->I have printed and filled out the order form for the PSE testing. He will call [facility] to see about having the blood drawn.     Subclinical hypothyroidism  Last TSH 11/7/2024 elevated at 4.08  T4 normal at 0.86      BPH with LUTs  Most recent PSA increased from 8.13 -> 9.96  hx of elevated PSA, and BPH w/ LUTs. Dr Rangel recommended a prostate biopsy. Patient refused biopsy without an MRI.   ->MRI scheduled for 2/14/25  Currently only taking  0.4 Flomax instead of 0.8 per Dr. Hughes ([facility]) because  "he states he feels too dizzy with two tablets      Patient's father had prostate cancer   No previous prostate biopsies     Vitamin D deficiency  Last Vitamin D level from 11/2024 at 26  Currently takes vitamin d 50,000 units weekly      Arteriosclerotic Cardiovascular Disease  CT Cardiac calcium scoring done in July 2024 which showed elevated calcium. Score distribution breakdown is as follows:  LM 2.56,  .1,  LCx 329.96,  .27,  -> Total 1744    Abnormal lipid panel from 7/2024; repeated in November and improvement noted    -VLDL elevated at 52 -> 29  -Triglycerides elevated at 259 -> 146    Current therapy:  -fish oil 1200 mg once a day   -baby Aspirin 81 mg every day        Social History     Tobacco Use    Smoking status: Never    Smokeless tobacco: Never   Vaping Use    Vaping status: Never Used   Substance Use Topics    Alcohol use: Never    Drug use: Never       Immunization History   Administered Date(s) Administered    Tdap vaccine, age 7 year and older (BOOSTRIX, ADACEL) 11/01/2018       Review of Systems   Constitutional: Negative.  Negative for activity change, appetite change, chills, fatigue and fever.   Respiratory: Negative.  Negative for cough, shortness of breath and wheezing.    Cardiovascular:  Negative for chest pain and palpitations.   Gastrointestinal: Negative.  Negative for abdominal pain, constipation, diarrhea and nausea.   Endocrine: Negative.    Genitourinary:  Negative for difficulty urinating.   Musculoskeletal: Negative.    Neurological: Negative.  Negative for dizziness, light-headedness and headaches.   Psychiatric/Behavioral: Negative.  Negative for sleep disturbance. The patient is not nervous/anxious.        Allergies   Allergen Reactions    Bee Venom Protein (Honey Bee) Swelling    Pollen Extracts Other     Eye irritation and sinus issues        Visit Vitals  /86 (BP Location: Right arm)   Pulse 66   Ht 1.778 m (5' 10\")   Wt 88.5 kg (195 lb)   SpO2 98%   BMI " 27.98 kg/m²   Smoking Status Never   BSA 2.09 m²       Physical Exam  Constitutional:       General: He is not in acute distress.     Appearance: Normal appearance.   HENT:      Head: Normocephalic.   Cardiovascular:      Rate and Rhythm: Normal rate and regular rhythm.      Pulses: Normal pulses.      Heart sounds: Normal heart sounds. No murmur heard.     No friction rub. No gallop.   Pulmonary:      Effort: Pulmonary effort is normal.      Breath sounds: Normal breath sounds. No wheezing, rhonchi or rales.   Abdominal:      General: Bowel sounds are normal. There is no distension.      Palpations: Abdomen is soft.      Tenderness: There is no abdominal tenderness. There is no guarding.   Musculoskeletal:         General: Normal range of motion.      Cervical back: Normal range of motion.   Skin:     General: Skin is warm.   Neurological:      Mental Status: He is alert and oriented to person, place, and time.      Motor: No weakness.   Psychiatric:         Mood and Affect: Mood normal.         Behavior: Behavior normal.         Current Outpatient Medications   Medication Instructions    aspirin 81 mg, oral, Daily    omega 3-dha-epa-fish oil (Fish OiL) 1,000 (120-180) mg capsule Take by mouth.    tamsulosin (FLOMAX) 0.4 mg, oral, Nightly       Lab Results   Component Value Date    WBC 4.4 11/07/2024    HGB 15.6 11/07/2024    HCT 46.9 11/07/2024     11/07/2024    CHOL 155 11/07/2024    TRIG 146 11/07/2024    HDL 41.7 11/07/2024    ALT 7 (L) 11/07/2024    AST 17 11/07/2024     11/07/2024    K 4.2 11/07/2024     11/07/2024    CREATININE 0.91 11/07/2024    BUN 18 11/07/2024    CO2 29 11/07/2024    TSH 4.08 (H) 11/07/2024    HGBA1C 5.4 11/07/2024       ASSESSMENT AND PLAN:  Assessment/Plan   Problem List Items Addressed This Visit             ICD-10-CM    ASCVD (arteriosclerotic cardiovascular disease) I25.10     CT Cardiac calcium scoring done in July 2024 which showed elevated calcium. Score  distribution breakdown is as follows:  LM 2.56,  .1,  LCx 329.96,  .27,  -> Total 1744    Abnormal lipid panel from 7/2024; repeated in November and improvement noted    -VLDL elevated at 52 -> 29  -Triglycerides elevated at 259 -> 146    Current therapy:  -fish oil 1200 mg once a day   -baby Aspirin 81 mg every day           Benign prostatic hyperplasia with lower urinary tract symptoms N40.1     Most recent PSA increased from 8.13 -> 9.96  hx of elevated PSA, and BPH w/ LUTs. Dr Rangel recommended a prostate biopsy. Patient refused biopsy without an MRI.   ->MRI scheduled for 2/14/25  Currently only taking  0.4 Flomax instead of 0.8 per Dr. Hughes ([facility]) because he states he feels too dizzy with two tablets      Patient's father had prostate cancer   No previous prostate biopsies     He is also wondering how he can have the Episwitch prostate testing done.   He is now only following with Dr. Hughes with the [facility].   ->I have printed and filled out the order form for the PSE testing. He will call [facility] to see about having the blood drawn.          Subclinical hypothyroidism E03.8     Last TSH 11/7/2024 elevated at 4.08  T4 normal at 0.86  -will repeat labs  prior to future appointment           Other Visit Diagnoses         Codes    Genetic screening    -  Primary Z13.79    Relevant Orders    Referral to Genetics                Assessment/Plan   --------------------  Keep MRI appointment   Keep any future appointments with Urology     PSE testing paper requisition  Genetics referral to [facility]     Follow up in 3 months   Complete labs prior to follow up

## 2025-02-03 NOTE — PATIENT INSTRUCTIONS
It was nice seeing you today, Pancho!     Continue with current medication regimens.   Follow up in 3 months and complete labs prior to follow up.

## 2025-02-04 ENCOUNTER — APPOINTMENT (OUTPATIENT)
Dept: LAB | Facility: HOSPITAL | Age: 65
End: 2025-02-04
Payer: COMMERCIAL

## 2025-02-04 ENCOUNTER — OFFICE VISIT (OUTPATIENT)
Dept: PRIMARY CARE | Facility: CLINIC | Age: 65
End: 2025-02-04
Payer: COMMERCIAL

## 2025-02-04 VITALS
SYSTOLIC BLOOD PRESSURE: 129 MMHG | WEIGHT: 195 LBS | BODY MASS INDEX: 27.92 KG/M2 | OXYGEN SATURATION: 98 % | HEART RATE: 66 BPM | DIASTOLIC BLOOD PRESSURE: 86 MMHG | HEIGHT: 70 IN

## 2025-02-04 DIAGNOSIS — Z13.79 GENETIC SCREENING: Primary | ICD-10-CM

## 2025-02-04 DIAGNOSIS — E03.8 SUBCLINICAL HYPOTHYROIDISM: ICD-10-CM

## 2025-02-04 DIAGNOSIS — N40.1 BENIGN PROSTATIC HYPERPLASIA WITH LOWER URINARY TRACT SYMPTOMS, SYMPTOM DETAILS UNSPECIFIED: ICD-10-CM

## 2025-02-04 DIAGNOSIS — I25.10 ASCVD (ARTERIOSCLEROTIC CARDIOVASCULAR DISEASE): ICD-10-CM

## 2025-02-04 PROBLEM — F90.9 ATTENTION DEFICIT HYPERACTIVITY DISORDER (ADHD): Status: ACTIVE | Noted: 2025-01-31

## 2025-02-04 PROBLEM — F41.1 GENERALIZED ANXIETY DISORDER: Status: ACTIVE | Noted: 2025-01-31

## 2025-02-04 PROCEDURE — 3008F BODY MASS INDEX DOCD: CPT

## 2025-02-04 PROCEDURE — 99212 OFFICE O/P EST SF 10 MIN: CPT

## 2025-02-04 PROCEDURE — 1036F TOBACCO NON-USER: CPT

## 2025-02-04 ASSESSMENT — ENCOUNTER SYMPTOMS
FEVER: 0
APPETITE CHANGE: 0
ACTIVITY CHANGE: 0
CONSTIPATION: 0
ABDOMINAL PAIN: 0
NAUSEA: 0
DIARRHEA: 0
CHILLS: 0

## 2025-02-04 ASSESSMENT — PAIN SCALES - GENERAL: PAINLEVEL_OUTOF10: 0-NO PAIN

## 2025-02-04 NOTE — ASSESSMENT & PLAN NOTE
CT Cardiac calcium scoring done in July 2024 which showed elevated calcium. Score distribution breakdown is as follows:  LM 2.56,  .1,  LCx 329.96,  .27,  -> Total 1744    Abnormal lipid panel from 7/2024; repeated in November and improvement noted    -VLDL elevated at 52 -> 29  -Triglycerides elevated at 259 -> 146    Current therapy:  -fish oil 1200 mg once a day   -baby Aspirin 81 mg every day

## 2025-02-04 NOTE — ASSESSMENT & PLAN NOTE
Last TSH 11/7/2024 elevated at 4.08  T4 normal at 0.86  -will repeat labs  prior to future appointment

## 2025-02-04 NOTE — ASSESSMENT & PLAN NOTE
Most recent PSA increased from 8.13 -> 9.96  hx of elevated PSA, and BPH w/ LUTs. Dr Rangel recommended a prostate biopsy. Patient refused biopsy without an MRI.   ->MRI scheduled for 2/14/25  Currently only taking  0.4 Flomax instead of 0.8 per Dr. Hughes ([facility]) because he states he feels too dizzy with two tablets      Patient's father had prostate cancer   No previous prostate biopsies     He is also wondering how he can have the Episwitch prostate testing done.   He is now only following with Dr. Hughes with the [facility].   ->I have printed and filled out the order form for the PSE testing. He will call [facility] to see about having the blood drawn.

## 2025-02-21 ENCOUNTER — APPOINTMENT (OUTPATIENT)
Dept: PRIMARY CARE | Facility: CLINIC | Age: 65
End: 2025-02-21
Payer: COMMERCIAL

## 2025-02-25 ENCOUNTER — APPOINTMENT (OUTPATIENT)
Dept: PRIMARY CARE | Facility: CLINIC | Age: 65
End: 2025-02-25
Payer: COMMERCIAL

## 2025-02-25 NOTE — PROGRESS NOTES
"  Genetics Department  [address] [place], [place] [address]  P: [phone]  F: [phone]    GENETICS NEW Patient    Pancho Brito  MRN: [id number]   : 1960     Primary care provider:  JUNG Flores-CNP  Chief complaint:  autism  Present at visit: patient     HPI:  Pancho Brito is a 64 y.o. old male with autism.    He was an only child and always had issues with social situations. No close friends. They lived in an isolated location.     In elementary school- hid behind a piano the first day    \"Loner\" in school. Made connections with older people and better one on one.  Does not like to be with many people, likes animals.     His daughter has Asperger's and he would like to know if he has this gene or if he is a carrier.     He feels that his divorce is related to his Asperger's.  He was in a group practice, but found that hard. 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    Surgeries/Hospitalizations:  None     Birth History:  [place]: full term  There were no medication exposures, alcohol, there was tobacco, or street drug exposures in utero.  Delivery History:  born via vaginal delivery.  There were no delivery complications.  weighing normal  born at [facility].  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     Social History: lives alone. Did live with his daughter for 5 years. Did try counseling. She will not do further therapy. - was the .     Family History:     Family history was reviewed and the following concerns were apparent:  Father , Alzheimer, depression  Mother , introverted, did not graduate from , breast cancer over 50,  of broken heart, ?COVID, her mother was also introverted and her brother had speech " delay  Daughter Asperger, social anxiety, will not leave home, has some paranoia  Daughter with mental health issues  Son is healthy     The remainder of the family history was negative for birth defects, intellectual disability, recurrent pregnancy loss, or recognized inherited conditions. Consanguinity was denied. Ashkenazi Gnosticist Ancestry was denied. The Pedigree is available for a full review of the family history.    Review of Systems   Constitutional:         Prostatitis   HENT: Negative.     Eyes: Negative.         Floaters      Respiratory: Negative.          CPAP in the past- lost weight   Cardiovascular: Negative.         CT calcium score abnormal   Gastrointestinal: Negative.    Endocrine: Negative.    Musculoskeletal:         OA in low back   Skin: Negative.    Allergic/Immunologic: Positive for environmental allergies. Negative for food allergies.   Neurological: Negative.    Hematological: Negative.    Psychiatric/Behavioral:  Negative for sleep disturbance. The patient is nervous/anxious.       Physical Exam    GENERAL  Alert, NAD   HEENT normocephalic with normal hair distribution and pattern; symmetric face;  ears normally formed set and rotated;  normal nose; normal philtrum;  Symmetric facial movements.    Neck Supple with no extra skin or webbing     ASSESSMENT/RECOMMENDATIONS:  - Approximately 20% of cases of autism have an identifiable genetic cause.  - A positive genetic test result will provide an underlying diagnosis that will in turn give additional information regarding prognosis of disorder as well as future health issues to anticipate.   - Recommendations for the treatment/prevention may be made on the basis of the test results and may include specialist evaluations, imaging studies, developmental therapies, as well as others.   - Additionally, a positive genetic test result will provide information regarding the chance for other family members to have a child with the same condition.      Tests recommended:   - Chromosomal microarray:   ------ This test looks for extra or missing pieces of genetic information.   ------ We reviewed that this is not looking for one specific genetic cause, but rather looking across all of our genetic information.   ------ This testing could come back positive (which would provide a diagnosis), negative (normal), or uncertain.  ------ If testing is positive, we would discuss the condition in more detail and look for any other health problems that can be associated with that condition.  ------ If it is negative, this does not rule out all genetic causes, it just shows us that the amount of DNA is normal.  ------ If the testing is uncertain, we typically test parents to see if this gene change is new or inherited from a parent.   ------ This testing can show unexpected results.  ------ It can also tell us if parents are related to each other by blood.      Whole Exome Sequencing (ODALIS)      We feel that whole exome sequencing is medically indicated to provide a more specific diagnosis for Pancoh.  This will help determine what further evaluations should be done in the future and how his health should be monitored over time.  Therefore, this genetic testing has the potential to change his medical management.  Our discussion is summarized below.    We discussed ODALIS:  We discussed the benefits and limitations of the whole exome sequencing (ODALIS) test, which examines the working regions of more than 20,000 genes (accounts for approximately ~2% of all human genetic material).   Reported diagnostic rates from genetic testing labs have found that ODALIS has a ~20-40% positive diagnostic rate, with higher rates being reported from trio analysis (i.e. Pancho and his parents) compared to using just the Pancho's sample.   Notably, ~5-7% of individuals who have ODALIS have had dual diagnoses (i.e. two non-overlapping clinical presentations) (PMID:  13703875, [id number], [id number]).     DNA samples from  "both parents are requested when a child is having ODALIS, but they are not available  This test is prone to yield variants of unknown significance, or uncertain findings.   With time, the meaning of many of these uncertain findings becomes clearer.     Some of the reasons that the diagnostic number is not higher may include:   Some genes are not examined in as much detail as others in the setting of a very broad test,  Certain types of gene changes are not detectable by this test,   Some of the genes that can cause particular symptoms may not have been identified yet (are not well understood).  In addition, the test is not designed to diagnose disorders caused by changes in multiple genes (multigenic) or by genes and environmental factors together (multifactorial).    Pancho declined to receive information about the receive information about the >70 genes on the medically-actionable \"incidental findings\" list provided by the American College of Medical Genetics and Genomics for Pancho, as well as for themselves.  Some variations in these genes may increase your risk for serious health problems such as cancer or heart problems.  Around 5-6% of individuals will test positive for at least one secondary finding (PMID: 27368670).  This version of the test will not provide information about carrier status for common diseases or how Pancho's body metabolizes medications.   We will also examine the mitochondrial DNA (a special type of DNA involved in energy production) as part of this test.     We discussed the protections and limitations of the Genetic Information Nondiscrimination Act (HARRIS).  HARRIS generally makes in illegal for health insurance companies, group health plans, and most employers (with >15 employees) to discriminate based on genetic information.   It does not protect against genetic discrimination for life insurance, disability insurance, long-term care, or other insurances.    Pancho received genetic counseling regarding " the benefits, risks, and limitations of the testing and have elected to proceed with ODALIS.     The [facility] laboratory quotes an 8-12 week turnaround time for results of the test.     [employer] insurance is [employer], so there should be no issues with cost of testing.  We discussed in detail that genetic testing may not provide the guidance that is wanted regarding relationships and medical practice and a  may be helpful    PLAN:  Pancho received genetic counseling regarding the benefits, risks, and limitations of the testing and have elected to proceed with the ODALIS and CMA  Buccal (cheek) swab kits and consent forms for Pancho Brito will be sent to his address.   2 month follow-up appointment to review the results of the testing.  If you have any questions before that, please contact [facility] at  [phone]     Your test results may be released to you when they are reported.   We have scheduled a time to review these results in detail.   If you choose to view your results in advance of your visit, your provider may not be available to discuss.  Most people choose to review results with their provider at the visit.       This was a clinical encounter in which I spent greater than 75 minutes engaged in activities related to this visit which included records review, preparing to see the patient, ordering specialized genetic testing pedigree analysis, completing the evaluation, counseling, documentation, and coordination.  We discussed the differential diagnosis, genetic principles including inheritance, genetic testing options, possible outcomes, and reasoning for further studies.      ELECTRONIC SIGNATURE:   Sruthi Holland MD  Clinical

## 2025-03-05 ENCOUNTER — APPOINTMENT (OUTPATIENT)
Dept: GENETICS | Facility: CLINIC | Age: 65
End: 2025-03-05
Payer: COMMERCIAL

## 2025-03-05 VITALS — TEMPERATURE: 98.6 F | BODY MASS INDEX: 28.58 KG/M2 | RESPIRATION RATE: 18 BRPM | HEIGHT: 69 IN | WEIGHT: 193 LBS

## 2025-03-05 DIAGNOSIS — F84.0 AUTISM (HHS-HCC): Primary | ICD-10-CM

## 2025-03-05 DIAGNOSIS — Z13.79 GENETIC SCREENING: ICD-10-CM

## 2025-03-05 PROCEDURE — 99417 PROLNG OP E/M EACH 15 MIN: CPT | Performed by: MEDICAL GENETICS

## 2025-03-05 PROCEDURE — 99205 OFFICE O/P NEW HI 60 MIN: CPT | Performed by: MEDICAL GENETICS

## 2025-03-05 PROCEDURE — 3008F BODY MASS INDEX DOCD: CPT | Performed by: MEDICAL GENETICS

## 2025-03-05 ASSESSMENT — ENCOUNTER SYMPTOMS
EYES NEGATIVE: 1
RESPIRATORY NEGATIVE: 1
HEMATOLOGIC/LYMPHATIC NEGATIVE: 1
NEUROLOGICAL NEGATIVE: 1
NERVOUS/ANXIOUS: 1
SLEEP DISTURBANCE: 0
CARDIOVASCULAR NEGATIVE: 1
GASTROINTESTINAL NEGATIVE: 1
ENDOCRINE NEGATIVE: 1

## 2025-03-05 NOTE — LETTER
03/05/25    Steph Gamboa, JUNG-CNP  [address]  [facility] [place] [address]      Dear JUNG Saenz-CNP,    Thank you for referring your patient, Pancho Brito, to receive care in my office. I have enclosed a summary of the care provided to Pancho on 03/05/25.    Please contact me with any questions you may have regarding the visit.    Sincerely,         Sruthi Holland MD  [address]  [address]  [facility] [address]    CC: No Recipients

## 2025-03-05 NOTE — LETTER
03/05/25    Steph Gamboa, JUNG-CNP  [address]  [facility] [address]      Dear JUNG Saenz-CNP,    I am writing to confirm that your patient, Pancho Brito  received care in my office on 03/05/25. I have enclosed a summary of the care provided to Pancho for your reference.    Please contact me with any questions you may have regarding the visit.    Sincerely,         Sruthi Holland MD  [address] [facility] [address] [facility]  [address]  [facility] [address]    CC: No Recipients

## 2025-04-01 DIAGNOSIS — Z00.00 HEALTHCARE MAINTENANCE: ICD-10-CM

## 2025-04-02 RX ORDER — ASPIRIN 81 MG/1
81 TABLET ORAL DAILY
Qty: 90 TABLET | Refills: 0 | Status: SHIPPED | OUTPATIENT
Start: 2025-04-02

## 2025-05-06 ENCOUNTER — APPOINTMENT (OUTPATIENT)
Dept: PRIMARY CARE | Facility: CLINIC | Age: 65
End: 2025-05-06
Payer: COMMERCIAL

## 2025-05-08 ENCOUNTER — APPOINTMENT (OUTPATIENT)
Dept: PRIMARY CARE | Facility: CLINIC | Age: 65
End: 2025-05-08
Payer: COMMERCIAL

## 2025-05-08 PROBLEM — I87.2 PERIPHERAL VENOUS INSUFFICIENCY: Status: RESOLVED | Noted: 2024-07-08 | Resolved: 2025-05-08

## 2025-05-08 PROBLEM — F32.5 MAJOR DEPRESSION IN REMISSION: Status: ACTIVE | Noted: 2025-05-08

## 2025-05-08 PROBLEM — F41.1 GENERALIZED ANXIETY DISORDER: Status: RESOLVED | Noted: 2025-01-31 | Resolved: 2025-05-08

## 2025-05-08 PROBLEM — M66.321 NONTRAUMATIC RUPTURE OF RIGHT PROXIMAL BICEPS TENDON: Status: RESOLVED | Noted: 2021-11-17 | Resolved: 2025-05-08

## 2025-05-08 PROBLEM — I25.10 ASCVD (ARTERIOSCLEROTIC CARDIOVASCULAR DISEASE): Status: RESOLVED | Noted: 2021-06-07 | Resolved: 2025-05-08

## 2025-05-08 PROBLEM — E03.8 SUBCLINICAL HYPOTHYROIDISM: Status: RESOLVED | Noted: 2024-11-08 | Resolved: 2025-05-08

## 2025-05-08 PROBLEM — F32.A DEPRESSION: Status: RESOLVED | Noted: 2024-07-08 | Resolved: 2025-05-08

## 2025-05-08 PROBLEM — H16.041 MARGINAL CORNEAL ULCER OF RIGHT EYE: Status: RESOLVED | Noted: 2017-09-22 | Resolved: 2025-05-08

## 2025-05-08 PROBLEM — J30.9 ALLERGIC RHINITIS: Status: RESOLVED | Noted: 2024-07-08 | Resolved: 2025-05-08

## 2025-05-08 PROBLEM — M25.511 ACUTE PAIN OF RIGHT SHOULDER: Status: RESOLVED | Noted: 2021-11-17 | Resolved: 2025-05-08

## 2025-05-08 PROBLEM — F90.9 ATTENTION DEFICIT HYPERACTIVITY DISORDER (ADHD): Status: RESOLVED | Noted: 2025-01-31 | Resolved: 2025-05-08

## 2025-05-08 PROBLEM — H66.009 ACUTE SUPPURATIVE OTITIS MEDIA: Status: RESOLVED | Noted: 2024-07-08 | Resolved: 2025-05-08

## 2025-05-08 PROBLEM — J45.909 REACTIVE AIRWAY DISEASE (HHS-HCC): Status: RESOLVED | Noted: 2024-07-08 | Resolved: 2025-05-08

## 2025-05-12 ENCOUNTER — APPOINTMENT (OUTPATIENT)
Dept: PRIMARY CARE | Facility: CLINIC | Age: 65
End: 2025-05-12
Payer: COMMERCIAL

## 2025-05-12 VITALS
HEART RATE: 54 BPM | WEIGHT: 195.7 LBS | BODY MASS INDEX: 28.85 KG/M2 | SYSTOLIC BLOOD PRESSURE: 105 MMHG | DIASTOLIC BLOOD PRESSURE: 72 MMHG | OXYGEN SATURATION: 98 %

## 2025-05-12 DIAGNOSIS — E55.9 VITAMIN D DEFICIENCY: ICD-10-CM

## 2025-05-12 DIAGNOSIS — N40.1 BENIGN PROSTATIC HYPERPLASIA WITH LOWER URINARY TRACT SYMPTOMS, SYMPTOM DETAILS UNSPECIFIED: ICD-10-CM

## 2025-05-12 DIAGNOSIS — R93.1 ELEVATED CORONARY ARTERY CALCIUM SCORE: ICD-10-CM

## 2025-05-12 DIAGNOSIS — F32.5 MAJOR DEPRESSION IN REMISSION: ICD-10-CM

## 2025-05-12 DIAGNOSIS — R53.82 CHRONIC FATIGUE: Primary | ICD-10-CM

## 2025-05-12 PROCEDURE — 99214 OFFICE O/P EST MOD 30 MIN: CPT

## 2025-05-12 PROCEDURE — 1036F TOBACCO NON-USER: CPT

## 2025-05-12 RX ORDER — HYDROCORTISONE 25 MG/G
CREAM TOPICAL
COMMUNITY
Start: 2025-02-14

## 2025-05-12 RX ORDER — ERGOCALCIFEROL 1.25 MG/1
1 CAPSULE ORAL
COMMUNITY
Start: 2025-03-06 | End: 2025-05-15 | Stop reason: SDUPTHER

## 2025-05-12 RX ORDER — FINASTERIDE 5 MG/1
5 TABLET, FILM COATED ORAL DAILY
COMMUNITY

## 2025-05-12 ASSESSMENT — ENCOUNTER SYMPTOMS
DYSPHORIC MOOD: 1
CARDIOVASCULAR NEGATIVE: 1
PALPITATIONS: 0
RESPIRATORY NEGATIVE: 1
SHORTNESS OF BREATH: 0
WHEEZING: 0
BACK PAIN: 1
ALLERGIC/IMMUNOLOGIC NEGATIVE: 1
FATIGUE: 1
HEMATOLOGIC/LYMPHATIC NEGATIVE: 1
NEUROLOGICAL NEGATIVE: 1
CONSTIPATION: 1

## 2025-05-12 ASSESSMENT — PATIENT HEALTH QUESTIONNAIRE - PHQ9
2. FEELING DOWN, DEPRESSED OR HOPELESS: NOT AT ALL
SUM OF ALL RESPONSES TO PHQ9 QUESTIONS 1 AND 2: 0
1. LITTLE INTEREST OR PLEASURE IN DOING THINGS: NOT AT ALL

## 2025-05-12 ASSESSMENT — COLUMBIA-SUICIDE SEVERITY RATING SCALE - C-SSRS
2. HAVE YOU ACTUALLY HAD ANY THOUGHTS OF KILLING YOURSELF?: NO
1. IN THE PAST MONTH, HAVE YOU WISHED YOU WERE DEAD OR WISHED YOU COULD GO TO SLEEP AND NOT WAKE UP?: NO
6. HAVE YOU EVER DONE ANYTHING, STARTED TO DO ANYTHING, OR PREPARED TO DO ANYTHING TO END YOUR LIFE?: NO

## 2025-05-12 ASSESSMENT — PAIN SCALES - GENERAL: PAINLEVEL_OUTOF10: 0-NO PAIN

## 2025-05-12 NOTE — PROGRESS NOTES
Subjective   Patient ID: Pancho Brito is a 65 y.o. male who presents for [facility].  Today he is accompanied by alone. He reports he has felt fatigued over last year.     HPI    Fatigue  - has had fatigue x 1 years.  - reports possible tic bite 5 years ago... was treated with antibiotics and felt better  - CT score of 1744, stated had a previous stress test... no results available  - stress test ordered, labs for vitamin, mineral ordered    Subclinical hypothyroidism  Last TSH 11/7/2024 elevated at 4.08  T4 normal at 0.86   - labs ordered     BPH with LUTs  Most recent PSA increased from 8.13 -> 9.96  hx of elevated PSA, and BPH w/ LUTs. Dr Rangel recommended a prostate biopsy. Patient refused biopsy without an MRI.   - got second opinion with Ellen  - MRI 2/2025  IMPRESSION:  No focal lesion with imaging features suspicious for clinically  significant prostate cancer (PI-RADS 2)  No lymphadenopathy or pelvic osseous lesion.    Currently taking  Flomax 0.4 mg, Finasteride   Patient's father had prostate cancer   No previous prostate biopsies      Vitamin D deficiency  Last Vitamin D level from 11/2024 at 26  Currently takes vitamin d 50,000 units weekly  - labs ordered      Arteriosclerotic Cardiovascular Disease  CT Cardiac calcium scoring done in July 2024 which showed elevated calcium. Score distribution breakdown is as follows:  LM 2.56,  .1,  LCx 329.96,  .27,  -> Total 1744     Abnormal lipid panel from 7/2024; repeated in November and improvement noted    -VLDL elevated at 52 -> 29  -Triglycerides elevated at 259 -> 146     Current therapy:  -fish oil 1200 mg once a day   -baby Aspirin 81 mg every day      --Stress test ordered    Review of Systems   Constitutional:  Positive for fatigue.   HENT: Negative.     Respiratory: Negative.  Negative for shortness of breath and wheezing.    Cardiovascular: Negative.  Negative for chest pain, palpitations and leg swelling.   Gastrointestinal:   Positive for constipation.   Genitourinary: Negative.    Musculoskeletal:  Positive for back pain.   Skin: Negative.    Allergic/Immunologic: Negative.    Neurological: Negative.    Hematological: Negative.    Psychiatric/Behavioral:  Positive for dysphoric mood.        Objective   /72 (BP Location: Left arm)   Pulse 54   Wt 88.8 kg (195 lb 11.2 oz)   SpO2 98%   BMI 28.85 kg/m²   BSA: 2.08 meters squared  Growth percentiles: Facility age limit for growth %kadeem is 20 years. Facility age limit for growth %kadeem is 20 years.     Physical Exam  Constitutional:       General: He is not in acute distress.     Appearance: Normal appearance. He is normal weight.   HENT:      Head: Normocephalic.      Right Ear: Tympanic membrane and ear canal normal.      Left Ear: Tympanic membrane normal.      Nose: Nose normal.      Mouth/Throat:      Mouth: Mucous membranes are moist.      Pharynx: Oropharynx is clear.   Eyes:      Extraocular Movements: Extraocular movements intact.      Conjunctiva/sclera: Conjunctivae normal.      Pupils: Pupils are equal, round, and reactive to light.   Cardiovascular:      Rate and Rhythm: Normal rate and regular rhythm.      Pulses: Normal pulses.      Heart sounds: Normal heart sounds. No murmur heard.  Pulmonary:      Effort: Pulmonary effort is normal. No respiratory distress.      Breath sounds: Normal breath sounds. No wheezing, rhonchi or rales.   Abdominal:      General: Abdomen is flat. Bowel sounds are normal.      Palpations: Abdomen is soft.   Musculoskeletal:         General: Normal range of motion.      Cervical back: Normal range of motion and neck supple.      Right lower leg: No edema.      Left lower leg: No edema.   Skin:     General: Skin is warm and dry.      Capillary Refill: Capillary refill takes less than 2 seconds.   Neurological:      General: No focal deficit present.      Mental Status: He is alert. Mental status is at baseline.   Psychiatric:         Mood and  Affect: Mood normal.         Behavior: Behavior normal.         Thought Content: Thought content normal.         Judgment: Judgment normal.       /72 (BP Location: Left arm)   Pulse 54   Wt 88.8 kg (195 lb 11.2 oz)   SpO2 98%   BMI 28.85 kg/m²    Lab Results   Component Value Date    GLUCOSE 88 11/07/2024    CALCIUM 9.3 11/07/2024     11/07/2024    K 4.2 11/07/2024    CO2 29 11/07/2024     11/07/2024    BUN 18 11/07/2024    CREATININE 0.91 11/07/2024        Assessment/Plan   Fatigue  - stress test ordered, labs for vitamin, mineral ordered    Subclinical hypothyroidism   - labs ordered     BPH with LUTs  Most recent PSA increased from 8.13 -> 9.96  - got second opinion with Ellen  - MRI 2/2025  IMPRESSION:  No focal lesion with imaging features suspicious for clinically  significant prostate cancer (PI-RADS 2)  No lymphadenopathy or pelvic osseous lesion.    Currently taking  Flomax 0.4 mg, Finasteride      Vitamin D deficiency  Currently takes vitamin d 50,000 units weekly  - labs ordered      Arteriosclerotic Cardiovascular Disease  CT Cardiac calcium scoring done in July 2024 which showed elevated calcium.   -> Total 1744     Current therapy:  -fish oil 1200 mg once a day   -baby Aspirin 81 mg every day      --Stress test ordered      Problem List Items Addressed This Visit       Benign prostatic hyperplasia with lower urinary tract symptoms    Relevant Orders    CBC and Auto Differential    Comprehensive metabolic panel    Hemoglobin A1c    TSH    T4, free    Vitamin D 25-Hydroxy,Total (for eval of Vitamin D levels)    Urinalysis with Reflex Microscopic    Vitamin B12    Vitamin C    Vitamin A    Vitamin B1, Whole Blood    Vitamin B2, Plasma    Vitamin B3 (Niacin and Metabolites)    Vitamin B6    Vitamin E    Vitamin K    Folate    Iron and TIBC    Ferritin    Magnesium    Testosterone, total and free    Vitamin D deficiency    Relevant Orders    CBC and Auto Differential    Comprehensive  metabolic panel    Hemoglobin A1c    TSH    T4, free    Vitamin D 25-Hydroxy,Total (for eval of Vitamin D levels)    Urinalysis with Reflex Microscopic    Vitamin B12    Vitamin C    Vitamin A    Vitamin B1, Whole Blood    Vitamin B2, Plasma    Vitamin B3 (Niacin and Metabolites)    Vitamin B6    Vitamin E    Vitamin K    Folate    Iron and TIBC    Ferritin    Magnesium    Testosterone, total and free    Major depression in remission (CMS-HCC)    Relevant Orders    CBC and Auto Differential    Comprehensive metabolic panel    Hemoglobin A1c    TSH    T4, free    Vitamin D 25-Hydroxy,Total (for eval of Vitamin D levels)    Urinalysis with Reflex Microscopic    Vitamin B12    Vitamin C    Vitamin A    Vitamin B1, Whole Blood    Vitamin B2, Plasma    Vitamin B3 (Niacin and Metabolites)    Vitamin B6    Vitamin E    Vitamin K    Folate    Iron and TIBC    Ferritin    Magnesium    Testosterone, total and free     Other Visit Diagnoses         Chronic fatigue    -  Primary    Relevant Orders    Vitamin B12    Vitamin C    Vitamin A    Vitamin B1, Whole Blood    Vitamin B2, Plasma    Vitamin B3 (Niacin and Metabolites)    Vitamin B6    Vitamin E    Vitamin K    Folate    Iron and TIBC    Ferritin    Magnesium    Testosterone, total and free    Stress Test      Elevated coronary artery calcium score        Relevant Orders    Stress Test        It was great to see you today!  Please continue taking your prescribed medications.   Refill have been sent to your pharmacy.    I have ordered some labs to be done as soon as you can.  We will call you with the results.    Please get stress test    RTC in 6 months

## 2025-05-15 DIAGNOSIS — E61.1 IRON DEFICIENCY: Primary | ICD-10-CM

## 2025-05-15 DIAGNOSIS — E55.9 VITAMIN D DEFICIENCY: ICD-10-CM

## 2025-05-15 RX ORDER — FERROUS SULFATE 325(65) MG
325 TABLET, DELAYED RELEASE (ENTERIC COATED) ORAL
Qty: 90 TABLET | Refills: 3 | Status: SHIPPED | OUTPATIENT
Start: 2025-05-15 | End: 2026-05-15

## 2025-05-15 RX ORDER — ERGOCALCIFEROL 1.25 MG/1
1.25 CAPSULE ORAL
Qty: 12 CAPSULE | Refills: 1 | Status: SHIPPED | OUTPATIENT
Start: 2025-05-15

## 2025-05-17 LAB
25(OH)D3+25(OH)D2 SERPL-MCNC: 23 NG/ML (ref 30–100)
A-TOCOPHEROL VIT E SERPL-MCNC: 10.1 MG/L (ref 5.7–19.9)
ALBUMIN SERPL-MCNC: 4.6 G/DL (ref 3.6–5.1)
ALP SERPL-CCNC: 58 U/L (ref 35–144)
ALT SERPL-CCNC: 7 U/L (ref 9–46)
ANION GAP SERPL CALCULATED.4IONS-SCNC: 11 MMOL/L (CALC) (ref 7–17)
APPEARANCE UR: CLEAR
AST SERPL-CCNC: 17 U/L (ref 10–35)
BASOPHILS # BLD AUTO: 18 CELLS/UL (ref 0–200)
BASOPHILS NFR BLD AUTO: 0.4 %
BETA+GAMMA TOCOPHEROL SERPL-MCNC: <1 MG/L
BILIRUB SERPL-MCNC: 0.6 MG/DL (ref 0.2–1.2)
BILIRUB UR QL STRIP: NEGATIVE
BUN SERPL-MCNC: 15 MG/DL (ref 7–25)
CALCIUM SERPL-MCNC: 9.1 MG/DL (ref 8.6–10.3)
CHLORIDE SERPL-SCNC: 104 MMOL/L (ref 98–110)
CO2 SERPL-SCNC: 26 MMOL/L (ref 20–32)
COLOR UR: YELLOW
CREAT SERPL-MCNC: 0.87 MG/DL (ref 0.7–1.35)
EGFRCR SERPLBLD CKD-EPI 2021: 96 ML/MIN/1.73M2
EOSINOPHIL # BLD AUTO: 131 CELLS/UL (ref 15–500)
EOSINOPHIL NFR BLD AUTO: 2.9 %
ERYTHROCYTE [DISTWIDTH] IN BLOOD BY AUTOMATED COUNT: 13.6 % (ref 11–15)
EST. AVERAGE GLUCOSE BLD GHB EST-MCNC: 108 MG/DL
EST. AVERAGE GLUCOSE BLD GHB EST-SCNC: 6 MMOL/L
FERRITIN SERPL-MCNC: 66 NG/ML (ref 24–380)
FOLATE SERPL-MCNC: 13.4 NG/ML
GLUCOSE SERPL-MCNC: 106 MG/DL (ref 65–139)
GLUCOSE UR QL STRIP: NEGATIVE
HBA1C MFR BLD: 5.4 %
HCT VFR BLD AUTO: 48.2 % (ref 38.5–50)
HGB BLD-MCNC: 15.7 G/DL (ref 13.2–17.1)
HGB UR QL STRIP: NEGATIVE
IRON SATN MFR SERPL: 16 % (CALC) (ref 20–48)
IRON SERPL-MCNC: 54 MCG/DL (ref 50–180)
KETONES UR QL STRIP: NEGATIVE
LEUKOCYTE ESTERASE UR QL STRIP: NEGATIVE
LYMPHOCYTES # BLD AUTO: 1179 CELLS/UL (ref 850–3900)
LYMPHOCYTES NFR BLD AUTO: 26.2 %
MAGNESIUM SERPL-MCNC: 2.5 MG/DL (ref 1.5–2.5)
MCH RBC QN AUTO: 29.1 PG (ref 27–33)
MCHC RBC AUTO-ENTMCNC: 32.6 G/DL (ref 32–36)
MCV RBC AUTO: 89.4 FL (ref 80–100)
MONOCYTES # BLD AUTO: 279 CELLS/UL (ref 200–950)
MONOCYTES NFR BLD AUTO: 6.2 %
NEUTROPHILS # BLD AUTO: 2894 CELLS/UL (ref 1500–7800)
NEUTROPHILS NFR BLD AUTO: 64.3 %
NIACIN SERPL-MCNC: <20 NG/ML
NICOTINAMIDE SERPL-MCNC: 39 NG/ML
NITRITE UR QL STRIP: NEGATIVE
PH UR STRIP: 6.5 [PH] (ref 5–8)
PHYTONADIONE SERPL-MCNC: 703 PG/ML (ref 130–1500)
PLATELET # BLD AUTO: 172 THOUSAND/UL (ref 140–400)
PMV BLD REES-ECKER: 10.3 FL (ref 7.5–12.5)
POTASSIUM SERPL-SCNC: 4.4 MMOL/L (ref 3.5–5.3)
PROT SERPL-MCNC: 7.3 G/DL (ref 6.1–8.1)
PROT UR QL STRIP: NEGATIVE
PYRIDOXAL PHOS SERPL-MCNC: 11.6 NG/ML (ref 2.1–21.7)
RBC # BLD AUTO: 5.39 MILLION/UL (ref 4.2–5.8)
SODIUM SERPL-SCNC: 141 MMOL/L (ref 135–146)
SP GR UR STRIP: 1.01 (ref 1–1.03)
T4 FREE SERPL-MCNC: 1.1 NG/DL (ref 0.8–1.8)
TESTOST FREE SERPL-MCNC: 67.8 PG/ML (ref 35–155)
TESTOST SERPL-MCNC: 570 NG/DL (ref 250–1100)
TIBC SERPL-MCNC: 337 MCG/DL (CALC) (ref 250–425)
TSH SERPL-ACNC: 3.85 MIU/L (ref 0.4–4.5)
VIT A SERPL-MCNC: 61 MCG/DL (ref 38–98)
VIT B1 BLD-SCNC: NORMAL NMOL/L
VIT B12 SERPL-MCNC: 290 PG/ML (ref 200–1100)
VIT B2 SERPL-SCNC: 9.8 NMOL/L (ref 6.2–39)
VIT C SERPL-MCNC: 0.6 MG/DL (ref 0.2–2.1)
WBC # BLD AUTO: 4.5 THOUSAND/UL (ref 3.8–10.8)

## 2025-05-19 LAB
25(OH)D3+25(OH)D2 SERPL-MCNC: 23 NG/ML (ref 30–100)
A-TOCOPHEROL VIT E SERPL-MCNC: 10.1 MG/L (ref 5.7–19.9)
ALBUMIN SERPL-MCNC: 4.6 G/DL (ref 3.6–5.1)
ALP SERPL-CCNC: 58 U/L (ref 35–144)
ALT SERPL-CCNC: 7 U/L (ref 9–46)
ANION GAP SERPL CALCULATED.4IONS-SCNC: 11 MMOL/L (CALC) (ref 7–17)
APPEARANCE UR: CLEAR
AST SERPL-CCNC: 17 U/L (ref 10–35)
BASOPHILS # BLD AUTO: 18 CELLS/UL (ref 0–200)
BASOPHILS NFR BLD AUTO: 0.4 %
BETA+GAMMA TOCOPHEROL SERPL-MCNC: <1 MG/L
BILIRUB SERPL-MCNC: 0.6 MG/DL (ref 0.2–1.2)
BILIRUB UR QL STRIP: NEGATIVE
BUN SERPL-MCNC: 15 MG/DL (ref 7–25)
CALCIUM SERPL-MCNC: 9.1 MG/DL (ref 8.6–10.3)
CHLORIDE SERPL-SCNC: 104 MMOL/L (ref 98–110)
CO2 SERPL-SCNC: 26 MMOL/L (ref 20–32)
COLOR UR: YELLOW
CREAT SERPL-MCNC: 0.87 MG/DL (ref 0.7–1.35)
EGFRCR SERPLBLD CKD-EPI 2021: 96 ML/MIN/1.73M2
EOSINOPHIL # BLD AUTO: 131 CELLS/UL (ref 15–500)
EOSINOPHIL NFR BLD AUTO: 2.9 %
ERYTHROCYTE [DISTWIDTH] IN BLOOD BY AUTOMATED COUNT: 13.6 % (ref 11–15)
EST. AVERAGE GLUCOSE BLD GHB EST-MCNC: 108 MG/DL
EST. AVERAGE GLUCOSE BLD GHB EST-SCNC: 6 MMOL/L
FERRITIN SERPL-MCNC: 66 NG/ML (ref 24–380)
FOLATE SERPL-MCNC: 13.4 NG/ML
GLUCOSE SERPL-MCNC: 106 MG/DL (ref 65–139)
GLUCOSE UR QL STRIP: NEGATIVE
HBA1C MFR BLD: 5.4 %
HCT VFR BLD AUTO: 48.2 % (ref 38.5–50)
HGB BLD-MCNC: 15.7 G/DL (ref 13.2–17.1)
HGB UR QL STRIP: NEGATIVE
IRON SATN MFR SERPL: 16 % (CALC) (ref 20–48)
IRON SERPL-MCNC: 54 MCG/DL (ref 50–180)
KETONES UR QL STRIP: NEGATIVE
LEUKOCYTE ESTERASE UR QL STRIP: NEGATIVE
LYMPHOCYTES # BLD AUTO: 1179 CELLS/UL (ref 850–3900)
LYMPHOCYTES NFR BLD AUTO: 26.2 %
MAGNESIUM SERPL-MCNC: 2.5 MG/DL (ref 1.5–2.5)
MCH RBC QN AUTO: 29.1 PG (ref 27–33)
MCHC RBC AUTO-ENTMCNC: 32.6 G/DL (ref 32–36)
MCV RBC AUTO: 89.4 FL (ref 80–100)
MONOCYTES # BLD AUTO: 279 CELLS/UL (ref 200–950)
MONOCYTES NFR BLD AUTO: 6.2 %
NEUTROPHILS # BLD AUTO: 2894 CELLS/UL (ref 1500–7800)
NEUTROPHILS NFR BLD AUTO: 64.3 %
NIACIN SERPL-MCNC: <20 NG/ML
NICOTINAMIDE SERPL-MCNC: 39 NG/ML
NITRITE UR QL STRIP: NEGATIVE
PH UR STRIP: 6.5 [PH] (ref 5–8)
PHYTONADIONE SERPL-MCNC: 703 PG/ML (ref 130–1500)
PLATELET # BLD AUTO: 172 THOUSAND/UL (ref 140–400)
PMV BLD REES-ECKER: 10.3 FL (ref 7.5–12.5)
POTASSIUM SERPL-SCNC: 4.4 MMOL/L (ref 3.5–5.3)
PROT SERPL-MCNC: 7.3 G/DL (ref 6.1–8.1)
PROT UR QL STRIP: NEGATIVE
PYRIDOXAL PHOS SERPL-MCNC: 11.6 NG/ML (ref 2.1–21.7)
RBC # BLD AUTO: 5.39 MILLION/UL (ref 4.2–5.8)
SODIUM SERPL-SCNC: 141 MMOL/L (ref 135–146)
SP GR UR STRIP: 1.01 (ref 1–1.03)
T4 FREE SERPL-MCNC: 1.1 NG/DL (ref 0.8–1.8)
TESTOST FREE SERPL-MCNC: 67.8 PG/ML (ref 35–155)
TESTOST SERPL-MCNC: 570 NG/DL (ref 250–1100)
TIBC SERPL-MCNC: 337 MCG/DL (CALC) (ref 250–425)
TSH SERPL-ACNC: 3.85 MIU/L (ref 0.4–4.5)
VIT A SERPL-MCNC: 61 MCG/DL (ref 38–98)
VIT B1 BLD-SCNC: 124 NMOL/L (ref 78–185)
VIT B12 SERPL-MCNC: 290 PG/ML (ref 200–1100)
VIT B2 SERPL-SCNC: 9.8 NMOL/L (ref 6.2–39)
VIT C SERPL-MCNC: 0.6 MG/DL (ref 0.2–2.1)
WBC # BLD AUTO: 4.5 THOUSAND/UL (ref 3.8–10.8)

## 2025-06-04 ENCOUNTER — APPOINTMENT (OUTPATIENT)
Dept: GENETICS | Facility: CLINIC | Age: 65
End: 2025-06-04
Payer: COMMERCIAL

## 2025-07-18 DIAGNOSIS — Z00.00 HEALTHCARE MAINTENANCE: ICD-10-CM

## 2025-07-18 RX ORDER — ASPIRIN 81 MG/1
81 TABLET ORAL DAILY
Qty: 90 TABLET | Refills: 0 | Status: SHIPPED | OUTPATIENT
Start: 2025-07-18

## 2025-11-17 ENCOUNTER — APPOINTMENT (OUTPATIENT)
Dept: PRIMARY CARE | Facility: CLINIC | Age: 65
End: 2025-11-17
Payer: COMMERCIAL